# Patient Record
Sex: FEMALE | Race: OTHER | HISPANIC OR LATINO | URBAN - METROPOLITAN AREA
[De-identification: names, ages, dates, MRNs, and addresses within clinical notes are randomized per-mention and may not be internally consistent; named-entity substitution may affect disease eponyms.]

---

## 2017-08-20 ENCOUNTER — EMERGENCY (EMERGENCY)
Facility: HOSPITAL | Age: 27
LOS: 1 days | Discharge: HOME | End: 2017-08-20
Attending: EMERGENCY MEDICINE | Admitting: EMERGENCY MEDICINE
Payer: COMMERCIAL

## 2017-08-20 VITALS
RESPIRATION RATE: 16 BRPM | HEART RATE: 131 BPM | OXYGEN SATURATION: 96 % | TEMPERATURE: 98 F | SYSTOLIC BLOOD PRESSURE: 104 MMHG | WEIGHT: 119.93 LBS | DIASTOLIC BLOOD PRESSURE: 69 MMHG | HEIGHT: 66 IN

## 2017-08-20 VITALS
TEMPERATURE: 98 F | DIASTOLIC BLOOD PRESSURE: 68 MMHG | SYSTOLIC BLOOD PRESSURE: 111 MMHG | RESPIRATION RATE: 18 BRPM | OXYGEN SATURATION: 99 % | HEART RATE: 89 BPM

## 2017-08-20 LAB
APAP SERPL-MCNC: <7.5 UG/ML — LOW (ref 10–26)
SALICYLATES SERPL-MCNC: <2 MG/DL — LOW (ref 10–20)

## 2017-08-20 PROCEDURE — 99285 EMERGENCY DEPT VISIT HI MDM: CPT | Mod: 25

## 2017-08-20 PROCEDURE — 96374 THER/PROPH/DIAG INJ IV PUSH: CPT

## 2017-08-20 PROCEDURE — 36415 COLL VENOUS BLD VENIPUNCTURE: CPT

## 2017-08-20 PROCEDURE — 80307 DRUG TEST PRSMV CHEM ANLYZR: CPT

## 2017-08-20 PROCEDURE — 99285 EMERGENCY DEPT VISIT HI MDM: CPT

## 2017-08-20 PROCEDURE — 93010 ELECTROCARDIOGRAM REPORT: CPT

## 2017-08-20 PROCEDURE — 96375 TX/PRO/DX INJ NEW DRUG ADDON: CPT

## 2017-08-20 PROCEDURE — 93005 ELECTROCARDIOGRAM TRACING: CPT

## 2017-08-20 RX ORDER — ONDANSETRON 8 MG/1
4 TABLET, FILM COATED ORAL ONCE
Qty: 0 | Refills: 0 | Status: COMPLETED | OUTPATIENT
Start: 2017-08-20 | End: 2017-08-20

## 2017-08-20 RX ORDER — SODIUM CHLORIDE 9 MG/ML
1000 INJECTION INTRAMUSCULAR; INTRAVENOUS; SUBCUTANEOUS ONCE
Qty: 0 | Refills: 0 | Status: COMPLETED | OUTPATIENT
Start: 2017-08-20 | End: 2017-08-20

## 2017-08-20 RX ORDER — NALOXONE HYDROCHLORIDE 4 MG/.1ML
2 SPRAY NASAL ONCE
Qty: 0 | Refills: 0 | Status: COMPLETED | OUTPATIENT
Start: 2017-08-20 | End: 2017-08-20

## 2017-08-20 RX ADMIN — SODIUM CHLORIDE 3000 MILLILITER(S): 9 INJECTION INTRAMUSCULAR; INTRAVENOUS; SUBCUTANEOUS at 14:17

## 2017-08-20 RX ADMIN — Medication 1 MILLIGRAM(S): at 09:45

## 2017-08-20 RX ADMIN — ONDANSETRON 4 MILLIGRAM(S): 8 TABLET, FILM COATED ORAL at 14:17

## 2017-08-20 RX ADMIN — NALOXONE HYDROCHLORIDE 2 MILLIGRAM(S): 4 SPRAY NASAL at 09:00

## 2017-08-20 RX ADMIN — SODIUM CHLORIDE 3000 MILLILITER(S): 9 INJECTION INTRAMUSCULAR; INTRAVENOUS; SUBCUTANEOUS at 09:00

## 2017-08-20 NOTE — ED BEHAVIORAL HEALTH NOTE - BEHAVIORAL HEALTH NOTE
SWNote: assistant nurse manager Min requested worker to meet with pt regarding substance abuse issues. Worker met with pt (Darlene) and Renay: assistant nurse manager Min requested worker to meet with pt regarding substance abuse issues. Worker met with pt and her aunt Denise(at bedside) pt authorized worker to speak in front of her aunt. Reportedly, pt has come to the resolution to stop drinking. Pt states she has been drinking since she was 16 yold of age on and off, states she has periods of times when she does not drink much and others when she start and can't stop. Pt states she has never thought she was going to end up at an ED unresponsive, feels very afraid about the thought that she could  lose her life. Reportedly, pt has two children (12yold dghtr and 9yold son) whom she describes as the reason to exist and would not like to leave them without a mother . Pt is  from their father and share custody with him. States that she usually drinks while they are with him or when they are staying with their cousins. States that she has been on vacation too and she has started drinking with her friends again. Reportedly, pt drinks beer most of the time ( around 6-12 ) Denies any other substance use on a regular basis however, admits doing what she thought it was cocaine last night . Pt believes she was given other substance because she has never felt as bad as she feels. Extensive talk about harmful effects from ETOH and other substances, pt receptive. States she wants to take care of her problem, pt not ready for inpatient rehab would like to do it on her own and on an outpatient basis. Worker provided written information about outpatient and inpatient programs available to her in her community. pt's aunt Denise states the whole family will be providing support and whatever is needed for pt to stop the habit. Worker encouraged pt to reconsider and to think about an inpatient program, states she has a job that she enjoys and would not like to lose it. Pt accepted resources list and [facility] programs will follow up accordingly. Pt can think about any other SW needs at this moment.
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
## 2017-08-20 NOTE — ED ADULT NURSE REASSESSMENT NOTE - NS ED NURSE REASSESS COMMENT FT1
pt states she would like to speak to someone for her alcohol and drug use, states she wants help, SW and  made aware, will continue to monitor.
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
## 2017-08-25 ENCOUNTER — EMERGENCY (EMERGENCY)
Facility: HOSPITAL | Age: 27
LOS: 1 days | Discharge: LEFT BEFORE DISCHARGE | End: 2017-08-25
Attending: EMERGENCY MEDICINE
Payer: COMMERCIAL

## 2017-08-25 VITALS
SYSTOLIC BLOOD PRESSURE: 147 MMHG | HEIGHT: 63 IN | WEIGHT: 139.99 LBS | HEART RATE: 71 BPM | RESPIRATION RATE: 20 BRPM | TEMPERATURE: 98 F | OXYGEN SATURATION: 99 % | DIASTOLIC BLOOD PRESSURE: 91 MMHG

## 2017-08-25 LAB
APPEARANCE UR: CLEAR — SIGNIFICANT CHANGE UP
BILIRUB UR-MCNC: NEGATIVE — SIGNIFICANT CHANGE UP
COLOR SPEC: YELLOW — SIGNIFICANT CHANGE UP
DIFF PNL FLD: NEGATIVE — SIGNIFICANT CHANGE UP
GLUCOSE UR QL: NEGATIVE MG/DL — SIGNIFICANT CHANGE UP
HCG UR QL: NEGATIVE — SIGNIFICANT CHANGE UP
KETONES UR-MCNC: NEGATIVE — SIGNIFICANT CHANGE UP
LEUKOCYTE ESTERASE UR-ACNC: NEGATIVE — SIGNIFICANT CHANGE UP
NITRITE UR-MCNC: NEGATIVE — SIGNIFICANT CHANGE UP
PH UR: 7 — SIGNIFICANT CHANGE UP (ref 5–8)
PROT UR-MCNC: NEGATIVE MG/DL — SIGNIFICANT CHANGE UP
SP GR SPEC: 1.01 — SIGNIFICANT CHANGE UP (ref 1.01–1.02)
UROBILINOGEN FLD QL: NEGATIVE MG/DL — SIGNIFICANT CHANGE UP

## 2017-08-25 PROCEDURE — 81025 URINE PREGNANCY TEST: CPT

## 2017-08-25 PROCEDURE — 81003 URINALYSIS AUTO W/O SCOPE: CPT

## 2017-08-25 NOTE — ED PROVIDER NOTE - OBJECTIVE STATEMENT
pt presents s/p unresponsive responded to narcan in field. she appears intoxicated unable to provide any furthe hirstory no signs over trauma
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
## 2017-08-25 NOTE — ED PROVIDER NOTE - MEDICAL DECISION MAKING DETAILS
pt became clincially sober denies si or hi ambulating discahrge neuro exam showing neuro: CN II - XII intact, EOMI, PERRL, no papilledema, 5/5 muscle strength x 4 extremities, no sensory deficits, 2+ dtr globally, negative babinski, no ataxic gait, normal PATRICIA and FNT, normal romberg

## 2018-05-05 ENCOUNTER — EMERGENCY (EMERGENCY)
Facility: HOSPITAL | Age: 28
LOS: 1 days | End: 2018-05-05
Attending: STUDENT IN AN ORGANIZED HEALTH CARE EDUCATION/TRAINING PROGRAM | Admitting: EMERGENCY MEDICINE
Payer: COMMERCIAL

## 2018-05-05 VITALS — WEIGHT: 132.06 LBS

## 2018-05-05 DIAGNOSIS — F33.9 MAJOR DEPRESSIVE DISORDER, RECURRENT, UNSPECIFIED: ICD-10-CM

## 2018-05-05 DIAGNOSIS — F10.10 ALCOHOL ABUSE, UNCOMPLICATED: ICD-10-CM

## 2018-05-05 LAB
ALBUMIN SERPL ELPH-MCNC: 4.9 G/DL — SIGNIFICANT CHANGE UP (ref 3.3–5.2)
ALP SERPL-CCNC: 102 U/L — SIGNIFICANT CHANGE UP (ref 40–120)
ALT FLD-CCNC: 34 U/L — HIGH
AMPHET UR-MCNC: NEGATIVE — SIGNIFICANT CHANGE UP
ANION GAP SERPL CALC-SCNC: 15 MMOL/L — SIGNIFICANT CHANGE UP (ref 5–17)
APAP SERPL-MCNC: <7.5 UG/ML — LOW (ref 10–26)
APPEARANCE UR: CLEAR — SIGNIFICANT CHANGE UP
APTT BLD: 27.6 SEC — SIGNIFICANT CHANGE UP (ref 27.5–37.4)
AST SERPL-CCNC: 31 U/L — SIGNIFICANT CHANGE UP
BARBITURATES UR SCN-MCNC: NEGATIVE — SIGNIFICANT CHANGE UP
BASE EXCESS BLDV CALC-SCNC: -2.7 MMOL/L — LOW (ref -2–2)
BASOPHILS # BLD AUTO: 0 K/UL — SIGNIFICANT CHANGE UP (ref 0–0.2)
BASOPHILS NFR BLD AUTO: 0.1 % — SIGNIFICANT CHANGE UP (ref 0–2)
BENZODIAZ UR-MCNC: NEGATIVE — SIGNIFICANT CHANGE UP
BILIRUB SERPL-MCNC: <0.2 MG/DL — LOW (ref 0.4–2)
BILIRUB UR-MCNC: NEGATIVE — SIGNIFICANT CHANGE UP
BLD GP AB SCN SERPL QL: SIGNIFICANT CHANGE UP
BUN SERPL-MCNC: 4 MG/DL — LOW (ref 8–20)
CA-I SERPL-SCNC: 1.1 MMOL/L — LOW (ref 1.15–1.33)
CALCIUM SERPL-MCNC: 9.1 MG/DL — SIGNIFICANT CHANGE UP (ref 8.6–10.2)
CHLORIDE BLDV-SCNC: 112 MMOL/L — HIGH (ref 98–107)
CHLORIDE SERPL-SCNC: 103 MMOL/L — SIGNIFICANT CHANGE UP (ref 98–107)
CK MB CFR SERPL CALC: 2.6 NG/ML — SIGNIFICANT CHANGE UP (ref 0–6.7)
CK SERPL-CCNC: 196 U/L — HIGH (ref 25–170)
CO2 SERPL-SCNC: 24 MMOL/L — SIGNIFICANT CHANGE UP (ref 22–29)
COCAINE METAB.OTHER UR-MCNC: POSITIVE
COLOR SPEC: YELLOW — SIGNIFICANT CHANGE UP
CREAT SERPL-MCNC: 0.56 MG/DL — SIGNIFICANT CHANGE UP (ref 0.5–1.3)
DIFF PNL FLD: ABNORMAL
EOSINOPHIL # BLD AUTO: 0 K/UL — SIGNIFICANT CHANGE UP (ref 0–0.5)
EOSINOPHIL NFR BLD AUTO: 0.3 % — SIGNIFICANT CHANGE UP (ref 0–6)
EPI CELLS # UR: SIGNIFICANT CHANGE UP
ETHANOL SERPL-MCNC: 206 MG/DL — SIGNIFICANT CHANGE UP
GAS PNL BLDA: SIGNIFICANT CHANGE UP
GAS PNL BLDV: 152 MMOL/L — HIGH (ref 135–145)
GAS PNL BLDV: SIGNIFICANT CHANGE UP
GAS PNL BLDV: SIGNIFICANT CHANGE UP
GLUCOSE BLDV-MCNC: 100 MG/DL — HIGH (ref 70–99)
GLUCOSE SERPL-MCNC: 100 MG/DL — SIGNIFICANT CHANGE UP (ref 70–115)
GLUCOSE UR QL: NEGATIVE MG/DL — SIGNIFICANT CHANGE UP
HCG SERPL-ACNC: <5 MIU/ML — SIGNIFICANT CHANGE UP
HCO3 BLDV-SCNC: 22 MMOL/L — SIGNIFICANT CHANGE UP (ref 20–26)
HCT VFR BLD CALC: 36.9 % — LOW (ref 37–47)
HCT VFR BLDA CALC: 35 — LOW (ref 39–50)
HGB BLD CALC-MCNC: 11.4 G/DL — LOW (ref 11.5–15.5)
HGB BLD-MCNC: 11.7 G/DL — LOW (ref 12–16)
INR BLD: 1.02 RATIO — SIGNIFICANT CHANGE UP (ref 0.88–1.16)
KETONES UR-MCNC: NEGATIVE — SIGNIFICANT CHANGE UP
LACTATE BLDV-MCNC: 1.7 MMOL/L — SIGNIFICANT CHANGE UP (ref 0.5–2)
LEUKOCYTE ESTERASE UR-ACNC: ABNORMAL
LIDOCAIN IGE QN: 51 U/L — SIGNIFICANT CHANGE UP (ref 22–51)
LYMPHOCYTES # BLD AUTO: 3 K/UL — SIGNIFICANT CHANGE UP (ref 1–4.8)
LYMPHOCYTES # BLD AUTO: 42.6 % — SIGNIFICANT CHANGE UP (ref 20–55)
MAGNESIUM SERPL-MCNC: 2.3 MG/DL — SIGNIFICANT CHANGE UP (ref 1.6–2.6)
MCHC RBC-ENTMCNC: 24.1 PG — LOW (ref 27–31)
MCHC RBC-ENTMCNC: 31.7 G/DL — LOW (ref 32–36)
MCV RBC AUTO: 76.1 FL — LOW (ref 81–99)
METHADONE UR-MCNC: NEGATIVE — SIGNIFICANT CHANGE UP
MONOCYTES # BLD AUTO: 0.7 K/UL — SIGNIFICANT CHANGE UP (ref 0–0.8)
MONOCYTES NFR BLD AUTO: 9.8 % — SIGNIFICANT CHANGE UP (ref 3–10)
NEUTROPHILS # BLD AUTO: 3.3 K/UL — SIGNIFICANT CHANGE UP (ref 1.8–8)
NEUTROPHILS NFR BLD AUTO: 46.9 % — SIGNIFICANT CHANGE UP (ref 37–73)
NITRITE UR-MCNC: NEGATIVE — SIGNIFICANT CHANGE UP
OPIATES UR-MCNC: NEGATIVE — SIGNIFICANT CHANGE UP
OTHER CELLS CSF MANUAL: 10 ML/DL — LOW (ref 18–22)
PCO2 BLDV: 59 MMHG — HIGH (ref 35–50)
PCP SPEC-MCNC: SIGNIFICANT CHANGE UP
PCP UR-MCNC: NEGATIVE — SIGNIFICANT CHANGE UP
PH BLDV: 7.24 — LOW (ref 7.32–7.43)
PH UR: 7 — SIGNIFICANT CHANGE UP (ref 5–8)
PLATELET # BLD AUTO: 343 K/UL — SIGNIFICANT CHANGE UP (ref 150–400)
PO2 BLDV: 42 MMHG — SIGNIFICANT CHANGE UP (ref 25–45)
POTASSIUM BLDV-SCNC: 3.5 MMOL/L — SIGNIFICANT CHANGE UP (ref 3.4–4.5)
POTASSIUM SERPL-MCNC: 3.8 MMOL/L — SIGNIFICANT CHANGE UP (ref 3.5–5.3)
POTASSIUM SERPL-SCNC: 3.8 MMOL/L — SIGNIFICANT CHANGE UP (ref 3.5–5.3)
PROT SERPL-MCNC: 8.8 G/DL — HIGH (ref 6.6–8.7)
PROT UR-MCNC: 30 MG/DL
PROTHROM AB SERPL-ACNC: 11.2 SEC — SIGNIFICANT CHANGE UP (ref 9.8–12.7)
RBC # BLD: 4.85 M/UL — SIGNIFICANT CHANGE UP (ref 4.4–5.2)
RBC # FLD: 16.2 % — HIGH (ref 11–15.6)
RBC CASTS # UR COMP ASSIST: ABNORMAL /HPF (ref 0–4)
SALICYLATES SERPL-MCNC: <0.6 MG/DL — LOW (ref 10–20)
SAO2 % BLDV: 62 % — SIGNIFICANT CHANGE UP
SODIUM SERPL-SCNC: 142 MMOL/L — SIGNIFICANT CHANGE UP (ref 135–145)
SP GR SPEC: 1 — LOW (ref 1.01–1.02)
THC UR QL: NEGATIVE — SIGNIFICANT CHANGE UP
TSH SERPL-MCNC: 0.8 UIU/ML — SIGNIFICANT CHANGE UP (ref 0.27–4.2)
TYPE + AB SCN PNL BLD: SIGNIFICANT CHANGE UP
UROBILINOGEN FLD QL: NEGATIVE MG/DL — SIGNIFICANT CHANGE UP
WBC # BLD: 7 K/UL — SIGNIFICANT CHANGE UP (ref 4.8–10.8)
WBC # FLD AUTO: 7 K/UL — SIGNIFICANT CHANGE UP (ref 4.8–10.8)
WBC UR QL: SIGNIFICANT CHANGE UP

## 2018-05-05 PROCEDURE — 74177 CT ABD & PELVIS W/CONTRAST: CPT | Mod: 26

## 2018-05-05 PROCEDURE — 99220: CPT

## 2018-05-05 PROCEDURE — 93010 ELECTROCARDIOGRAM REPORT: CPT | Mod: 76

## 2018-05-05 PROCEDURE — 71045 X-RAY EXAM CHEST 1 VIEW: CPT | Mod: 26

## 2018-05-05 PROCEDURE — 76856 US EXAM PELVIC COMPLETE: CPT | Mod: 26

## 2018-05-05 PROCEDURE — 99285 EMERGENCY DEPT VISIT HI MDM: CPT

## 2018-05-05 RX ORDER — FAMOTIDINE 10 MG/ML
20 INJECTION INTRAVENOUS ONCE
Qty: 0 | Refills: 0 | Status: COMPLETED | OUTPATIENT
Start: 2018-05-05 | End: 2018-05-05

## 2018-05-05 RX ORDER — SODIUM CHLORIDE 9 MG/ML
1000 INJECTION, SOLUTION INTRAVENOUS
Qty: 0 | Refills: 0 | Status: DISCONTINUED | OUTPATIENT
Start: 2018-05-05 | End: 2018-05-10

## 2018-05-05 RX ORDER — ACETAMINOPHEN 500 MG
650 TABLET ORAL EVERY 6 HOURS
Qty: 0 | Refills: 0 | Status: DISCONTINUED | OUTPATIENT
Start: 2018-05-05 | End: 2018-05-10

## 2018-05-05 RX ORDER — KETOROLAC TROMETHAMINE 30 MG/ML
15 SYRINGE (ML) INJECTION ONCE
Qty: 0 | Refills: 0 | Status: DISCONTINUED | OUTPATIENT
Start: 2018-05-05 | End: 2018-05-05

## 2018-05-05 RX ORDER — MORPHINE SULFATE 50 MG/1
2 CAPSULE, EXTENDED RELEASE ORAL ONCE
Qty: 0 | Refills: 0 | Status: DISCONTINUED | OUTPATIENT
Start: 2018-05-05 | End: 2018-05-05

## 2018-05-05 RX ORDER — ONDANSETRON 8 MG/1
4 TABLET, FILM COATED ORAL ONCE
Qty: 0 | Refills: 0 | Status: COMPLETED | OUTPATIENT
Start: 2018-05-05 | End: 2018-05-05

## 2018-05-05 RX ORDER — HYDROMORPHONE HYDROCHLORIDE 2 MG/ML
0.5 INJECTION INTRAMUSCULAR; INTRAVENOUS; SUBCUTANEOUS ONCE
Qty: 0 | Refills: 0 | Status: DISCONTINUED | OUTPATIENT
Start: 2018-05-05 | End: 2018-05-05

## 2018-05-05 RX ORDER — ACETAMINOPHEN 500 MG
1000 TABLET ORAL ONCE
Qty: 0 | Refills: 0 | Status: COMPLETED | OUTPATIENT
Start: 2018-05-05 | End: 2018-05-05

## 2018-05-05 RX ORDER — METOCLOPRAMIDE HCL 10 MG
10 TABLET ORAL EVERY 6 HOURS
Qty: 0 | Refills: 0 | Status: DISCONTINUED | OUTPATIENT
Start: 2018-05-05 | End: 2018-05-10

## 2018-05-05 RX ORDER — FAMOTIDINE 10 MG/ML
20 INJECTION INTRAVENOUS EVERY 12 HOURS
Qty: 0 | Refills: 0 | Status: DISCONTINUED | OUTPATIENT
Start: 2018-05-05 | End: 2018-05-06

## 2018-05-05 RX ORDER — METOCLOPRAMIDE HCL 10 MG
10 TABLET ORAL ONCE
Qty: 0 | Refills: 0 | Status: COMPLETED | OUTPATIENT
Start: 2018-05-05 | End: 2018-05-05

## 2018-05-05 RX ORDER — SODIUM CHLORIDE 9 MG/ML
1000 INJECTION INTRAMUSCULAR; INTRAVENOUS; SUBCUTANEOUS
Qty: 0 | Refills: 0 | Status: DISCONTINUED | OUTPATIENT
Start: 2018-05-05 | End: 2018-05-05

## 2018-05-05 RX ORDER — SODIUM CHLORIDE 9 MG/ML
1000 INJECTION INTRAMUSCULAR; INTRAVENOUS; SUBCUTANEOUS
Qty: 0 | Refills: 0 | Status: COMPLETED | OUTPATIENT
Start: 2018-05-05 | End: 2018-05-05

## 2018-05-05 RX ADMIN — SODIUM CHLORIDE 150 MILLILITER(S): 9 INJECTION, SOLUTION INTRAVENOUS at 23:23

## 2018-05-05 RX ADMIN — HYDROMORPHONE HYDROCHLORIDE 0.5 MILLIGRAM(S): 2 INJECTION INTRAMUSCULAR; INTRAVENOUS; SUBCUTANEOUS at 11:40

## 2018-05-05 RX ADMIN — Medication 15 MILLIGRAM(S): at 21:13

## 2018-05-05 RX ADMIN — Medication 400 MILLIGRAM(S): at 19:16

## 2018-05-05 RX ADMIN — FAMOTIDINE 20 MILLIGRAM(S): 10 INJECTION INTRAVENOUS at 11:20

## 2018-05-05 RX ADMIN — Medication 1000 MILLIGRAM(S): at 19:40

## 2018-05-05 RX ADMIN — FAMOTIDINE 20 MILLIGRAM(S): 10 INJECTION INTRAVENOUS at 23:23

## 2018-05-05 RX ADMIN — SODIUM CHLORIDE 2000 MILLILITER(S): 9 INJECTION INTRAMUSCULAR; INTRAVENOUS; SUBCUTANEOUS at 11:50

## 2018-05-05 RX ADMIN — Medication 15 MILLIGRAM(S): at 21:15

## 2018-05-05 RX ADMIN — SODIUM CHLORIDE 2000 MILLILITER(S): 9 INJECTION INTRAMUSCULAR; INTRAVENOUS; SUBCUTANEOUS at 11:20

## 2018-05-05 RX ADMIN — ONDANSETRON 4 MILLIGRAM(S): 8 TABLET, FILM COATED ORAL at 15:49

## 2018-05-05 RX ADMIN — Medication 10 MILLIGRAM(S): at 19:16

## 2018-05-05 RX ADMIN — ONDANSETRON 4 MILLIGRAM(S): 8 TABLET, FILM COATED ORAL at 11:29

## 2018-05-05 RX ADMIN — HYDROMORPHONE HYDROCHLORIDE 0.5 MILLIGRAM(S): 2 INJECTION INTRAMUSCULAR; INTRAVENOUS; SUBCUTANEOUS at 11:20

## 2018-05-05 NOTE — ED CDU PROVIDER INITIAL DAY NOTE - OBJECTIVE STATEMENT
29 y/o female presents to the ED s/p suicidal attempt which onset today. Pt notes that her daughter told pt that she did not love her anymore and that she did not want to live with pt anymore. Pt got upset and took 15 pills of 600mg and Omeprazole (unknown quantity or dosage) about 6 hours ago. She notes that she also drank alcohol. Pt currently c/o severe abd pain. Denies HA, numbness, tingling, or CP. No further complaints at this time.

## 2018-05-05 NOTE — ED CDU PROVIDER INITIAL DAY NOTE - NS ED ROS FT
CONST: no fevers, no chills, no trauma  ENT: no sore throat,  CV: no chest pain, no palpitations, no orthopnea, no extremity pain or swelling  RESP: no shortness of breath, no cough, no sputum, no pleurisy, no wheezing  ABD: (+) abdominal pain, +vomiting, no diarrhea, no black or bloody stool  : no dysuria, no hematuria, no frequency, no urgency, no vaginal bleeding  MSK: no back pain, no neck pain, no extremity pain  NEURO: no headache, no sensory disturbances, no focal weakness, no dizziness  HEME: no easy bleeding or bruising  PSYCH: see HPI  SKIN: no diaphoresis, no rash   Psych: Suicidal attempt.

## 2018-05-05 NOTE — ED CDU PROVIDER INITIAL DAY NOTE - DETAILS
- d/w toxicology - pt should expect to clear by tomorrow  - IVF  - H2 blockers  - non-narcotic pain control  - psychiatric placement if tolerates PO, medical admission if does not tolerate PO by tomorrow AM

## 2018-05-05 NOTE — ED PROVIDER NOTE - PHYSICAL EXAMINATION
VITALS: reviewed  GEN: mild distress, A & O x 4  HEAD/EYES: NCAT, PERRL, EOMI, anicteric sclerae, no conjunctival pallor  ENT: mucus membranes moist, oropharynx WNL, trachea midline, no JVD  RESP: lungs CTA with equal breath sounds bilaterally, chest wall nontender and atraumatic  CV: heart with reg rhythm S1, S2, no murmur; distal pulses intact and symmetric bilaterally  ABDOMEN: diffused tenderness   : no CVAT  MSK: extremities atraumatic and nontender, no edema, no asymmetry. the back is without midline or lateral tenderness, there is no spinal deformity or stepoff and the back is ranged painlessly. the neck has no midline tenderness, deformity, or stepoff, and is ranged painlessly.  SKIN: warm, dry, no rash, no bruising, no cyanosis. color appropriate for ethnicity  NEURO: alert, mentating appropriately, no facial asymmetry. gross sensation, motor, coordination are intact  PSYCH: Affect appropriate VITALS: reviewed  GEN: mild distress, A & O x 4  HEAD/EYES: NCAT, 5mm PERRL, anicteric sclerae, no conjunctival pallor  ENT: mucus dry, making secretions, oropharynx WNL, trachea midline, no JVD  RESP: lungs CTA with equal breath sounds bilaterally, chest wall nontender and atraumatic  CV: heart with reg rhythm S1, S2, no murmur; distal pulses intact and symmetric bilaterally  ABDOMEN: suprapubic and mid lower abd tenderness.   : no CVAT  MSK: extremities atraumatic and nontender, no edema, no asymmetry. the back is without midline or lateral tenderness, there is no spinal deformity or stepoff and the back is ranged painlessly. the neck has no midline tenderness, deformity, or stepoff, and is ranged painlessly.  SKIN: warm, dry, no rash, no bruising, no cyanosis. color appropriate for ethnicity  NEURO: alert, mentating appropriately, Slurring speech.   PSYCH: Affect appropriate VITALS: reviewed and notable for tachycardia  GEN: nauseated, mildly ill appearing, tearful, clinically intoxicated, A & O x name, place, situation, recent events  HEAD/EYES: NCAT, 5mm PERRL, anicteric sclerae, no conjunctival pallor  ENT: mucus dry, making secretions, oropharynx WNL, trachea midline, no JVD  RESP: lungs CTA with equal breath sounds bilaterally, chest wall nontender and atraumatic  CV: heart with reg rhythm S1, S2, no murmur; distal pulses intact and symmetric bilaterally  ABDOMEN: suprapubic and mid lower abd tenderness.   : no CVAT  MSK: extremities atraumatic and nontender, no edema, no asymmetry. the back is without midline or lateral tenderness, there is no spinal deformity or stepoff and the back is ranged painlessly. the neck has no midline tenderness, deformity, or stepoff, and is ranged painlessly.  SKIN: warm, dry, no rash, no bruising, no cyanosis. color appropriate for ethnicity, +normal secretions  NEURO: alert, mildly somnolent, protecting airway, GCS 15, Slurring speech. moving all extremities with good symmetric strength, sensation grossly intact, coordination grossly intact but not cooperative with formal testing, downgoing toes, no rigidity, no clonus  PSYCH: Affect appropriate

## 2018-05-05 NOTE — ED BEHAVIORAL HEALTH ASSESSMENT NOTE - HPI (INCLUDE ILLNESS QUALITY, SEVERITY, DURATION, TIMING, CONTEXT, MODIFYING FACTORS, ASSOCIATED SIGNS AND SYMPTOMS)
Pt. is a 29 yo female who presents to ED s/p overdose on 15 Motrin 600mg tabs and unknown amount of Omeprazole.  Pt. reports she had been drinking last night and this morning.  Her daughter told her she did not love her and did not want to live with her anymore.  Pt reports she was upset and took the Motrin and Omeprazole hoping to kill herself and continued to drink alcohol.  She called aunt who brought her to the hospital.  Pt. currently c/o of severe abdominal pain.  Pt. reports she was at [facility] last year for OD on alcohol and cocaine.  Pt. reported she has been drinking since she was 15yo.  Evasive when asked if she was trying to kill herself then.  She was referred to [facility] - she went twice and then stopped because of insurance problems.  She reports she was prescribed medication but cannot remember the name and states she did not take it.  Pt. denies auditory/visual hallucinations, delusions, paranoia.  She denies homicidal ideation.  Pt. requires inpatient psychiatric hospitalization for stabilization and medication management. Pt. is a 29 yo female who presents to ED s/p overdose on 15 Motrin 600mg tabs and unknown amount of Omeprazole.  Pt. reports she had been drinking last night and this morning.  Her daughter told her she did not love her and did not want to live with her anymore.  Pt reports she was upset and took the Motrin and Omeprazole hoping to kill herself and continued to drink alcohol.  She called aunt who brought her to the hospital.  Pt. currently c/o of severe abdominal pain and vomiting.  Pt. reports she was at [facility] last year for OD on alcohol and cocaine.  .  Evasive when asked if she was trying to kill herself then.  Pt. reported she has been drinking since she was 17yo.  She was referred to [facility] - she went twice and then stopped because of insurance problems.  She reports she was prescribed medication but cannot remember the name and states she did not take it.  Pt. denies auditory/visual hallucinations, delusions, paranoia.  She denies homicidal ideation.  Pt. requires inpatient psychiatric hospitalization for stabilization and medication management.

## 2018-05-05 NOTE — ED BEHAVIORAL HEALTH ASSESSMENT NOTE - SUICIDE PROTECTIVE FACTORS
Identifies reasons for living/Responsibility to family and others/Engaged in work or school/Supportive social network or family

## 2018-05-05 NOTE — ED CDU PROVIDER INITIAL DAY NOTE - PROGRESS NOTE DETAILS
Mental status improved, exam improving, awaiting CT. Aunt who brought her in says she is improved. report of low pH on iniital VBG. Appears most consistent with hypercarbia. Will get ABG after fluid  bolus CT cannot obtain consent for contrast. Pt has no contraindications and we feel administrative consent is necessary for an emergent rule out of acute intraabdominal pathology.

## 2018-05-05 NOTE — ED CDU PROVIDER INITIAL DAY NOTE - PHYSICAL EXAMINATION
VITALS: reviewed and notable for tachycardia  GEN: nauseated, mildly ill appearing, tearful, clinically intoxicated, A & O x name, place, situation, recent events  HEAD/EYES: NCAT, 5mm PERRL, anicteric sclerae, no conjunctival pallor  ENT: mucus dry, making secretions, oropharynx WNL, trachea midline, no JVD  RESP: lungs CTA with equal breath sounds bilaterally, chest wall nontender and atraumatic  CV: heart with reg rhythm S1, S2, no murmur; distal pulses intact and symmetric bilaterally  ABDOMEN: suprapubic and mid lower abd tenderness.   : no CVAT, declines pelvic exam  MSK: extremities atraumatic and nontender, no edema, no asymmetry. the back is without midline or lateral tenderness, there is no spinal deformity or stepoff and the back is ranged painlessly. the neck has no midline tenderness, deformity, or stepoff, and is ranged painlessly.  SKIN: warm, dry, no rash, no bruising, no cyanosis. color appropriate for ethnicity, +normal secretions  NEURO: alert, mildly somnolent, protecting airway, GCS 15, Slurring speech. moving all extremities with good symmetric strength, sensation grossly intact, coordination grossly intact but not cooperative with formal testing, downgoing toes, no rigidity, no clonus  PSYCH: Affect appropriate

## 2018-05-05 NOTE — ED ADULT NURSE NOTE - OBJECTIVE STATEMENT
Assumed pt care at 1115.  Pt a&ox3 states that she took about 15 600mg ibuprofen and drank alcohol.  Pt states her daughter told her that she did not want her to live anymore.  Pt c/o abdominal pain 8/10, no acute s/s of respiratory distress noted or reported, will continue to monitor

## 2018-05-05 NOTE — ED PROVIDER NOTE - NS ED ROS FT
CONST: no fevers, no chills, no trauma  EYES: no pain, no visual disturbances  ENT: no sore throat, no epistaxis, no rhinorrhea, no hearing changes  CV: no chest pain, no palpitations, no orthopnea, no extremity pain or swelling  RESP: no shortness of breath, no cough, no sputum, no pleurisy, no wheezing  ABD: (+) abdominal pain, no nausea, no vomiting, no diarrhea, no black or bloody stool  : no dysuria, no hematuria, no frequency, no urgency  MSK: no back pain, no neck pain, no extremity pain  NEURO: no headache, no sensory disturbances, no focal weakness, no dizziness  HEME: no easy bleeding or bruising  SKIN: no diaphoresis, no rash   Psych: Suicidal attempt. CONST: no fevers, no chills, no trauma  ENT: no sore throat,  CV: no chest pain, no palpitations, no orthopnea, no extremity pain or swelling  RESP: no shortness of breath, no cough, no sputum, no pleurisy, no wheezing  ABD: (+) abdominal pain, +vomiting, no diarrhea, no black or bloody stool  : no dysuria, no hematuria, no frequency, no urgency, no vaginal bleeding  MSK: no back pain, no neck pain, no extremity pain  NEURO: no headache, no sensory disturbances, no focal weakness, no dizziness  HEME: no easy bleeding or bruising  PSYCH: see HPI  SKIN: no diaphoresis, no rash   Psych: Suicidal attempt.

## 2018-05-05 NOTE — ED PROVIDER NOTE - PROGRESS NOTE DETAILS
initial labwork reassuring. Aditya from Behavioral health contacted, though pt not currently medically cleared. Mental status improved, exam improving, awaiting CT report of low pH on iniital VBG. Appears most consistent with hypercarbia. Will get ABG after fluid  bolus Mental status improved, exam improving, awaiting CT. Aunt who brought her in says she is improved. CT cannot obtain consent for contrast. Pt has no contraindications and we feel administrative consent is necessary for an emergent rule out of acute intraabdominal pathology. INtractible vomiting

## 2018-05-05 NOTE — ED BEHAVIORAL HEALTH ASSESSMENT NOTE - SUMMARY
Pt. is a 29 yo female who presents to ED s/p overdose on 15 Motrin 600mg tabs and unknown amount of Omeprazole.  Pt. reports she had been drinking last night and this morning.  Her daughter told her she did not love her and did not want to live with her anymore.  Pt reports she was upset and took the Motrin and Omeprazole hoping to kill herself and continued to drink alcohol.  She called aunt who brought her to the hospital.  Pt. currently c/o of severe abdominal pain.  Pt. reports she was at [facility] last year for OD on alcohol and cocaine.  Pt. reported she has been drinking since she was 15yo.  Evasive when asked if she was trying to kill herself then.  She was referred to [facility] - she went twice and then stopped because of insurance problems.  She reports she was prescribed medication but cannot remember the name and states she did not take it.  Pt. denies auditory/visual hallucinations, delusions, paranoia.  She denies homicidal ideation.  Pt. requires inpatient psychiatric hospitalization for stabilization and medication management.

## 2018-05-05 NOTE — ED PROVIDER NOTE - OBJECTIVE STATEMENT
29 y/o female with a hx of 27 y/o female presents to the ED s/p suicidal attempt which onset today. Pt notes that her daughter told pt that she did not want to live anymore. Pt got upset and took 15 pills of 600mg and Omeprazole (unknown quantity or dosage) about 6 hours ago. She notes that she also drank alcohol. Pt currently c/o severe abd pain. Denies HA, numbness, tingling, or CP. No further complaints at this time. 29 y/o female presents to the ED s/p suicidal attempt which onset today. Pt notes that her daughter told pt that she did not love her anymore and that she did not want to live with pt anymore. Pt got upset and took 15 pills of 600mg and Omeprazole (unknown quantity or dosage) about 6 hours ago. She notes that she also drank alcohol. Pt currently c/o severe abd pain. Denies HA, numbness, tingling, or CP. No further complaints at this time.

## 2018-05-05 NOTE — ED PROVIDER NOTE - CARE PLAN
Principal Discharge DX:	Intentional overdose of drug in tablet form  Secondary Diagnosis:	Right lower quadrant abdominal pain Principal Discharge DX:	Intractable vomiting with nausea, unspecified vomiting type  Secondary Diagnosis:	Right lower quadrant abdominal pain

## 2018-05-05 NOTE — ED CDU PROVIDER INITIAL DAY NOTE - MEDICAL DECISION MAKING DETAILS
11h in ED. D/w toxicology unlikely to be medical emergency that requires admission. Suspect gastritis/etneritis from overdose. Toxicology says high does omeprazole can also cause vomiting and diarrhea. Will observe overnight for pain control and hydration

## 2018-05-05 NOTE — ED PROVIDER NOTE - MEDICAL DECISION MAKING DETAILS
27 y/o female s/p suicidal attempt, took 15 pills of 600 mg Motrin and unknown amount of Omeprazole + Alcohol. Plan to obtain blood work, labs, medicate, UA, CT scan, X-ray, EKG and re-eval. Continue observing pt.

## 2018-05-05 NOTE — ED ADULT TRIAGE NOTE - CHIEF COMPLAINT QUOTE
Patient took unknown amount of 600mg Motrin pills this morning as per family.  Patient is suicidal as per family.  Patient responsive to mild tactile stimuli and not answering questions at this time.  Patient brought to critical care and code team called.

## 2018-05-05 NOTE — ED BEHAVIORAL HEALTH ASSESSMENT NOTE - DETAILS
11 yo daughter & 9yo son Pt. overdosed on 15 Motrin 600mg tabs and unknown amount of Omeprazole in SA. Upon acceptance Dr. Way aware 13 yo daughter & 10 yo son Admissions

## 2018-05-05 NOTE — ED BEHAVIORAL HEALTH ASSESSMENT NOTE - OTHER PAST PSYCHIATRIC HISTORY (INCLUDE DETAILS REGARDING ONSET, COURSE OF ILLNESS, INPATIENT/OUTPATIENT TREATMENT)
Seen at [facility] last year  but noncompliant with treatment & f/u. Seen at [facility] last year but noncompliant with treatment & f/u.

## 2018-05-05 NOTE — ED PROVIDER NOTE - PRINCIPAL DIAGNOSIS
Intentional overdose of drug in tablet form Intractable vomiting with nausea, unspecified vomiting type

## 2018-05-06 LAB
ETHANOL SERPL-MCNC: <10 MG/DL — SIGNIFICANT CHANGE UP
SALICYLATES SERPL-MCNC: <0.6 MG/DL — LOW (ref 10–20)

## 2018-05-06 PROCEDURE — 99226: CPT

## 2018-05-06 RX ORDER — FAMOTIDINE 10 MG/ML
40 INJECTION INTRAVENOUS ONCE
Qty: 0 | Refills: 0 | Status: COMPLETED | OUTPATIENT
Start: 2018-05-06 | End: 2018-05-06

## 2018-05-06 RX ADMIN — Medication 650 MILLIGRAM(S): at 23:53

## 2018-05-06 RX ADMIN — FAMOTIDINE 40 MILLIGRAM(S): 10 INJECTION INTRAVENOUS at 21:24

## 2018-05-06 RX ADMIN — Medication 650 MILLIGRAM(S): at 21:27

## 2018-05-06 RX ADMIN — FAMOTIDINE 20 MILLIGRAM(S): 10 INJECTION INTRAVENOUS at 06:28

## 2018-05-06 NOTE — ED CDU PROVIDER SUBSEQUENT DAY NOTE - HISTORY
recd sign out  plan to repeat alcohol and asa level, patient monitored, no further vomiting in ed noted  tolerated po wee. sitting up, feels betetr, plan to transfer to , continue observation

## 2018-05-06 NOTE — ED CDU PROVIDER SUBSEQUENT DAY NOTE - PROGRESS NOTE DETAILS
Received patient signout from Dr. More.  Patient presents with intentional motrin overdose.  Patient intially vomiting now improved.  Pending psych with plan to admit. Patient is medically cleared.  Pending psych dispo. Patient stable. Awaiting psych placement.

## 2018-05-06 NOTE — ED BEHAVIORAL HEALTH NOTE - BEHAVIORAL HEALTH NOTE
SW Note: Plan is to transfer pt for inpt psychiatric tx. Met with pt to discuss plans. Pt cooperative and pleasant. Aware of plan and in agreement. Referral pending at [facility]. Pt is willing to sign voluntary admission papers.   Discussed ETOH/substance issues. pt reports that she has been drinking ETOH since age 16. Reports mostly drinking on the weekends. Beer or liquor. Social to binge drinking. No hx of blackouts or seizures. No hx of legal issues from substance use. No hx of inpt substance abuse tx. Came to [facility] ED last summer for ETOH related issue and was referred to O/P tx at [facility] but did not go.   JUANA RN reports CIWA of 1 with mild nausea related to OD.   SW to follow for placement

## 2018-05-07 VITALS
DIASTOLIC BLOOD PRESSURE: 66 MMHG | SYSTOLIC BLOOD PRESSURE: 103 MMHG | TEMPERATURE: 99 F | HEART RATE: 53 BPM | OXYGEN SATURATION: 100 % | RESPIRATION RATE: 18 BRPM

## 2018-05-07 DIAGNOSIS — F33.9 MAJOR DEPRESSIVE DISORDER, RECURRENT, UNSPECIFIED: ICD-10-CM

## 2018-05-07 DIAGNOSIS — K21.9 GASTRO-ESOPHAGEAL REFLUX DISEASE WITHOUT ESOPHAGITIS: ICD-10-CM

## 2018-05-07 PROCEDURE — 85027 COMPLETE CBC AUTOMATED: CPT

## 2018-05-07 PROCEDURE — 99213 OFFICE O/P EST LOW 20 MIN: CPT

## 2018-05-07 PROCEDURE — 84295 ASSAY OF SERUM SODIUM: CPT

## 2018-05-07 PROCEDURE — 86850 RBC ANTIBODY SCREEN: CPT

## 2018-05-07 PROCEDURE — 93005 ELECTROCARDIOGRAM TRACING: CPT

## 2018-05-07 PROCEDURE — 96376 TX/PRO/DX INJ SAME DRUG ADON: CPT | Mod: XU

## 2018-05-07 PROCEDURE — 71045 X-RAY EXAM CHEST 1 VIEW: CPT

## 2018-05-07 PROCEDURE — 83735 ASSAY OF MAGNESIUM: CPT

## 2018-05-07 PROCEDURE — 83690 ASSAY OF LIPASE: CPT

## 2018-05-07 PROCEDURE — 83605 ASSAY OF LACTIC ACID: CPT

## 2018-05-07 PROCEDURE — 96375 TX/PRO/DX INJ NEW DRUG ADDON: CPT

## 2018-05-07 PROCEDURE — 82435 ASSAY OF BLOOD CHLORIDE: CPT

## 2018-05-07 PROCEDURE — 84443 ASSAY THYROID STIM HORMONE: CPT

## 2018-05-07 PROCEDURE — 84132 ASSAY OF SERUM POTASSIUM: CPT

## 2018-05-07 PROCEDURE — 84702 CHORIONIC GONADOTROPIN TEST: CPT

## 2018-05-07 PROCEDURE — 99217: CPT

## 2018-05-07 PROCEDURE — 82550 ASSAY OF CK (CPK): CPT

## 2018-05-07 PROCEDURE — 99285 EMERGENCY DEPT VISIT HI MDM: CPT | Mod: 25

## 2018-05-07 PROCEDURE — 74177 CT ABD & PELVIS W/CONTRAST: CPT

## 2018-05-07 PROCEDURE — 82947 ASSAY GLUCOSE BLOOD QUANT: CPT

## 2018-05-07 PROCEDURE — 96374 THER/PROPH/DIAG INJ IV PUSH: CPT | Mod: XU

## 2018-05-07 PROCEDURE — G0378: CPT

## 2018-05-07 PROCEDURE — 81001 URINALYSIS AUTO W/SCOPE: CPT

## 2018-05-07 PROCEDURE — 80307 DRUG TEST PRSMV CHEM ANLYZR: CPT

## 2018-05-07 PROCEDURE — 85610 PROTHROMBIN TIME: CPT

## 2018-05-07 PROCEDURE — 85014 HEMATOCRIT: CPT

## 2018-05-07 PROCEDURE — 86900 BLOOD TYPING SEROLOGIC ABO: CPT

## 2018-05-07 PROCEDURE — 85730 THROMBOPLASTIN TIME PARTIAL: CPT

## 2018-05-07 PROCEDURE — 82803 BLOOD GASES ANY COMBINATION: CPT

## 2018-05-07 PROCEDURE — 99211 OFF/OP EST MAY X REQ PHY/QHP: CPT

## 2018-05-07 PROCEDURE — 76856 US EXAM PELVIC COMPLETE: CPT

## 2018-05-07 PROCEDURE — 86901 BLOOD TYPING SEROLOGIC RH(D): CPT

## 2018-05-07 PROCEDURE — 36415 COLL VENOUS BLD VENIPUNCTURE: CPT

## 2018-05-07 PROCEDURE — 80053 COMPREHEN METABOLIC PANEL: CPT

## 2018-05-07 PROCEDURE — 82330 ASSAY OF CALCIUM: CPT

## 2018-05-07 PROCEDURE — 82553 CREATINE MB FRACTION: CPT

## 2018-05-07 RX ORDER — PANTOPRAZOLE SODIUM 20 MG/1
40 TABLET, DELAYED RELEASE ORAL
Qty: 0 | Refills: 0 | Status: DISCONTINUED | OUTPATIENT
Start: 2018-05-07 | End: 2018-05-10

## 2018-05-07 RX ADMIN — Medication 20 MILLIGRAM(S): at 09:47

## 2018-05-07 NOTE — ED BEHAVIORAL HEALTH NOTE - BEHAVIORAL HEALTH NOTE
Social work note: Pt evaluated by psychiatrist and requires inpatient hospitalization at this time per.  Worker placed call to [facility] and spoke with Deborah, admissions.  Clinincals were reviewed and pt was accepted to [facility] under the care of Dr. Ballard. Pt complete voluntary legals. Transportation arranged. Pt does not require authorization as she has straight medicaid.  No other social work needs at this time.

## 2018-05-07 NOTE — ED ADULT NURSE REASSESSMENT NOTE - NS ED NURSE REASSESS COMMENT FT1
Pt currently calm and cooperative at this present time, in no acute distress, resting, pt arousable, talking to undersigned states she feels fine at this time.  Will continue to monitor and maintain safety.
Pt currently resting in no acute distress at this present time, arousable.  Will continue to monitor and maintain safety.
Pt currently resting in no acute distress at this time, will continue to monitor and maintain safety.  Pt. awaiting bed, for transfer in the morning.
pt currently resting in no acute distress at this present time, pt denies any pain/discomfort at this present time.  Will continue to monitor and maintain safety.
Ambulatory with steady gait to bathroom with aide.  Urinated without difficulty.
Assumed patient care from GEORGES Patel at 2300, charting as noted. Receive patient on a yellow gown, lying in bed, a&ox3, able to make needs known in [place] and english. Remains on constant observation with aid at bedside. Patient denies any pain or discomfort at this time, ivf infusing well to right ac iv, patent, site without redness or swelling. Nsr on telebox cm, vss. Patient denies pain and discomfort at this time. Plan of care and wait time explained, patient verbalized understanding. Patient not in respiratory distress. To continue to monitor.
Aunt Larissa - [phone]
Dr. Way made aware of pH 7.24
Pt brought to  approx 7:05a, cooperative, pt states she is currently not SI and states because her children are with their father this made her very depressed and she Overdose of pills and alcohol.  She denies SI/HI denies any pain at this present jodi in no acute distress CIWA 3.  Pt wanded by security, pt in bed resting.   Will continue to monitor and maintain safety.
Pt remains at baseline with family at bedside, pt lethargic, medicated for nausea as per orders, no acute s/s of respiratory distress noted or reported, will continue to monitor
Pt responsive to deep tactile stimulation.  Vital signs stable.  NSR. Pt sent back from CT without study performed due to inability to consent to IV contrast.  Dr. Robles aware and will write administrative consent.
Patient ate 100% of her dinner.  Patient mood is subdued.  Denies suicidal or homicidal ideation.  No attempts to harm self or others and safety of patients maintained.
Patient is resting in bed.  No attempts to harm self or others and safety maintained.
Assumed care of patient at 0730.  Patient resting in bed awake.  Patient endorses having intermittent nausea but identifies nausea has been improving.  No emesis or pain.  Patient verbalizing regret for suicide attempt stating "everything happened so fast I wish I didn't do it".   No attempts to harm self or others.  Patient educated about plan of care and verbalized agreement with same.  Patient cooperated with ordered testing.  Safety of patient maintained.
Patient mood is subdued.  Patient ate 50% of her breakfast.  Patient endorses some mild nausea still s/p overdose of Motrin.  Patient is not exhibiting any signs or symptoms of withdrawal and CIWA is 1.  Safety maintained.

## 2018-05-07 NOTE — PROGRESS NOTE ADULT - SUBJECTIVE AND OBJECTIVE BOX
Patient states that she is feeling a little better today. Is still nauseous.    She is agreeable for voluntary admission to inpatient psych unit fearful she will act on suicidal impulses. Alcohol abuse is admittedly a problem Feels overwhelmed by stressors "Too many things going on"      Lying comfortably in bed. Willing to eat breakfast. Omeprazole given for acid reflux.       Vital Signs Last 24 Hrs  T(C): 37.1 (07 May 2018 07:25), Max: 37.1 (06 May 2018 11:43)  T(F): 98.7 (07 May 2018 07:25), Max: 98.8 (06 May 2018 11:43)  HR: 60 (07 May 2018 07:25) (58 - 79)  BP: 115/76 (07 May 2018 07:25) (108/63 - 120/72)  RR: 18 (07 May 2018 07:25) (17 - 18)  SpO2: 100% (07 May 2018 07:25) (97% - 100%)      ROS: +nausea and acid reflux.     MEDICATIONS  (STANDING):  pantoprazole    Tablet 40 milliGRAM(s) Oral before breakfast

## 2018-05-07 NOTE — ED ADULT NURSE REASSESSMENT NOTE - CONDITION
Pt asleep on initial rounds, up for breakfast. Complains of limited sleep. C/O stomach pains . Medicated with Bentyl. Pt informed she will leave shortly, requested a shower./unchanged

## 2018-05-07 NOTE — ED CDU PROVIDER DISPOSITION NOTE - CLINICAL COURSE
29 yo female presents for evaluation of suicidal ideation and attempt secondary to severe depression. Patient stated she drank alcohol and took several pills. Patient medically cleared and feeling much better. Patient is currently awaiting placement.

## 2018-05-07 NOTE — PROGRESS NOTE ADULT - ASSESSMENT
Due to high suicidal risk from OD on Motrin and ETOH consumption patient will be admitted to inpatient psychiatry.

## 2018-12-11 NOTE — ED ADULT NURSE NOTE - TEMPLATE LIST FOR HEAD TO TOE ASSESSMENT
WE RECEIVED ORDERS FOR PATIENT FOR BI LATERAL SI INJECTION FROM [facility]ARDS (M46.1)  PATIENT WAS SCHEDULED FOR AN OV BUT IT WAS CXL BECAUSE IT WAS FOR FOR 2019   WANTING TO KNOW IF HE NEEDS AN OV OR IF HE CAN JUST BE SCHEDULED FOR THE INJECTION? PLEASE ADVISE   s/p right ureteroplasty and insertion of ureteral stent Toxicology

## 2019-07-04 ENCOUNTER — EMERGENCY (EMERGENCY)
Facility: HOSPITAL | Age: 29
LOS: 1 days | Discharge: HOME | End: 2019-07-04
Attending: EMERGENCY MEDICINE
Payer: COMMERCIAL

## 2019-07-04 VITALS
WEIGHT: 149.91 LBS | HEART RATE: 102 BPM | RESPIRATION RATE: 18 BRPM | OXYGEN SATURATION: 98 % | TEMPERATURE: 98 F | DIASTOLIC BLOOD PRESSURE: 83 MMHG | HEIGHT: 64 IN | SYSTOLIC BLOOD PRESSURE: 138 MMHG

## 2019-07-04 PROBLEM — R01.1 CARDIAC MURMUR, UNSPECIFIED: Chronic | Status: ACTIVE | Noted: 2018-05-05

## 2019-07-04 PROBLEM — Z78.9 OTHER SPECIFIED HEALTH STATUS: Chronic | Status: ACTIVE | Noted: 2017-08-20

## 2019-07-04 PROBLEM — K29.70 GASTRITIS, UNSPECIFIED, WITHOUT BLEEDING: Chronic | Status: ACTIVE | Noted: 2018-05-05

## 2019-07-04 PROCEDURE — 99285 EMERGENCY DEPT VISIT HI MDM: CPT

## 2019-07-04 PROCEDURE — 99284 EMERGENCY DEPT VISIT MOD MDM: CPT
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
## 2019-07-04 NOTE — ED PROVIDER NOTE - CONSTITUTIONAL, MLM
normal... smells of etoh  awake, alert, oriented to person, place, time/situation and in no apparent distress.

## 2019-07-04 NOTE — ED PROVIDER NOTE - OBJECTIVE STATEMENT
pt presents s/p etoh use denies si or hi no trauma states " I drank too much" denies fever. denies HA or neck pain. no chest pain or sob. no abd pain. no n/v/d. no urinary f/u/d. no back pain. no motor or sensory deficits.  no rash. no other acute issues symptoms or concerns

## 2019-07-04 NOTE — ED PROVIDER NOTE - CLINICAL SUMMARY MEDICAL DECISION MAKING FREE TEXT BOX
clincally sober no trumma ambulating in nad refusing etoh resources boyfriend here to pick pt up return precautions given

## 2019-07-04 NOTE — ED ADULT NURSE REASSESSMENT NOTE - NS ED NURSE REASSESS COMMENT FT1
pt a&ox3, no n/v/d, ambulating with steady gait independently. tolerating po intake. boyfriend at bedside. ready for d/c, dr jordan aware and evaluated pt for dC

## 2019-07-04 NOTE — ED ADULT NURSE NOTE - NSIMPLEMENTINTERV_GEN_ALL_ED
Implemented All Fall Risk Interventions:  [place] to call system. Call bell, personal items and telephone within reach. Instruct patient to call for assistance. Room bathroom lighting operational. Non-slip footwear when patient is off stretcher. Physically safe environment: no spills, clutter or unnecessary equipment. Stretcher in lowest position, wheels locked, appropriate side rails in place. Provide visual cue, wrist band, yellow gown, etc. Monitor gait and stability. Monitor for mental status changes and reorient to person, place, and time. Review medications for side effects contributing to fall risk. Reinforce activity limits and safety measures with patient and family.
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
## 2019-08-15 ENCOUNTER — APPOINTMENT (OUTPATIENT)
Dept: GASTROENTEROLOGY | Facility: CLINIC | Age: 29
End: 2019-08-15

## 2019-09-16 NOTE — ED ADULT NURSE NOTE - PAIN RATING/NUMBER SCALE (0-10): REST
Subjective:      Patient ID: Marie Mcgraw is a 65 y.o. female. HPI     8

## 2019-10-11 NOTE — ED ADULT NURSE NOTE - DISCHARGE DATE/TIME
Is This A New Presentation, Or A Follow-Up?: Discoloration How Severe Is It?: moderate 04-Jul-2019 04:53

## 2019-11-05 ENCOUNTER — APPOINTMENT (OUTPATIENT)
Dept: GASTROENTEROLOGY | Facility: CLINIC | Age: 29
End: 2019-11-05
Payer: MEDICAID

## 2019-11-05 VITALS
OXYGEN SATURATION: 98 % | DIASTOLIC BLOOD PRESSURE: 77 MMHG | HEIGHT: 63 IN | WEIGHT: 160 LBS | SYSTOLIC BLOOD PRESSURE: 143 MMHG | BODY MASS INDEX: 28.35 KG/M2 | RESPIRATION RATE: 16 BRPM | HEART RATE: 101 BPM

## 2019-11-05 DIAGNOSIS — R12 HEARTBURN: ICD-10-CM

## 2019-11-05 DIAGNOSIS — R10.13 EPIGASTRIC PAIN: ICD-10-CM

## 2019-11-05 PROCEDURE — 99203 OFFICE O/P NEW LOW 30 MIN: CPT

## 2019-11-05 NOTE — REASON FOR VISIT
[Consultation] : a consultation visit [FreeTextEntry1] : Chronic dyspepsia, heartburn and regurgitation.

## 2019-11-05 NOTE — PHYSICAL EXAM
[Outer Ear] : the ears and nose were normal in appearance [Oropharynx] : the oropharynx was normal [Neck Appearance] : the appearance of the neck was normal [Neck Cervical Mass (___cm)] : no neck mass was observed [Jugular Venous Distention Increased] : there was no jugular-venous distention [Thyroid Diffuse Enlargement] : the thyroid was not enlarged [Thyroid Nodule] : there were no palpable thyroid nodules [Auscultation Breath Sounds / Voice Sounds] : lungs were clear to auscultation bilaterally [Heart Rate And Rhythm] : heart rate was normal and rhythm regular [Heart Sounds] : normal S1 and S2 [Heart Sounds Gallop] : no gallops [Murmurs] : no murmurs [Heart Sounds Pericardial Friction Rub] : no pericardial rub [Bowel Sounds] : normal bowel sounds [Abdomen Soft] : soft [Abdomen Tenderness] : non-tender [] : no hepato-splenomegaly [Abdomen Mass (___ Cm)] : no abdominal mass palpated

## 2019-11-05 NOTE — HISTORY OF PRESENT ILLNESS
[FreeTextEntry1] : 29-year-old,  female, whose only significant medical history was that of clinical gastritis, diagnosed at the age of 13, and then symptomatically treated for it ever since in [facility] 16 years ago. Patient has been on PPI medication for the past 16 years with fairly good control of her symptoms. There was a prior ER evaluation about 2 years ago, but no significant abnormalities were identified. Admission was not required. Patient alleges that last blood work was done in 2/19 and a notable only for hyperlipidemia and anemia. Patient has epigastric pain, which is a chronic and worsened by intake of fluids specifically, fatty food. She's never had a sonogram. This symptoms do not waxing waning. There is no rectal quadrant pain, or biliary colic symptoms. No prior upper endoscopy. No fever or jaundice. No symptoms of biliary colic. Overall symptoms have been improved with the use of omeprazole, heartburn and regurgitation are occasional and breakthrough despite the use of PPI medication. No medication side effect. She denies any dysphagia or diet, aphasia, or GI bleeding or melena. She denies having any weight loss and in fact, has gained 30 pounds in the last 3 months. Patient does not use aspirin or NSAIDs. There has been no prior GI bleeding.

## 2019-11-05 NOTE — ASSESSMENT
[FreeTextEntry1] : Chronic dyspepsia with moderate reflux symptomatology. No alarm symptoms. Possible Villatoro's esophagus or chronic dyspepsia or HPV infection. Possible anatomic defect such as hiatus hernia. Therefore, a screening endoscopy was offered to the patient. The procedure, its risks, benefits, and prep, were explained to patient, who understands and is agreeable to proceed. Sonogram of the abdomen has been requested to exclude gallstones and any other upper GI pathology. PPI medication. We held for 2 weeks prior to the procedure. Blood work prior to the procedure well. Include CBC, CMP, coagulation profile, amylase, lipase. ASA #2. Mallampati score. #2.

## 2019-11-05 NOTE — CONSULT LETTER
[Dear  ___] : Dear  [unfilled], [Consult Letter:] : I had the pleasure of evaluating your patient, [unfilled]. [Please see my note below.] : Please see my note below. [Consult Closing:] : Thank you very much for allowing me to participate in the care of this patient.  If you have any questions, please do not hesitate to contact me. [Sincerely,] : Sincerely, [FreeTextEntry1] : hronic dyspepsia. Chronic heartburn and regurgitation. On PPI medication for 16 years. No upper GI alarm symptoms. Negative. Physical exam. Plan, sonogram upper abdomen, blood work, amylase, lipase, upper endoscopy. Arranged; Results to follow. [FreeTextEntry3] : Carlos Cardoso MD FACG\par Diplomate American Board of Internal Medicine and Gastroenterolgy\par [facility]\par

## 2020-02-05 ENCOUNTER — APPOINTMENT (OUTPATIENT)
Dept: GASTROENTEROLOGY | Facility: GI CENTER | Age: 30
End: 2020-02-05

## 2020-03-12 ENCOUNTER — LABORATORY RESULT (OUTPATIENT)
Age: 30
End: 2020-03-12

## 2020-03-15 ENCOUNTER — APPOINTMENT (OUTPATIENT)
Dept: ULTRASOUND IMAGING | Facility: CLINIC | Age: 30
End: 2020-03-15

## 2020-03-16 LAB
ALBUMIN SERPL ELPH-MCNC: 4.3 G/DL
ALP BLD-CCNC: 112 U/L
ALT SERPL-CCNC: 47 U/L
AMYLASE/CREAT SERPL: 48 U/L
ANION GAP SERPL CALC-SCNC: 14 MMOL/L
AST SERPL-CCNC: 34 U/L
BASOPHILS # BLD AUTO: 0.03 K/UL
BASOPHILS NFR BLD AUTO: 0.5 %
BILIRUB SERPL-MCNC: 0.3 MG/DL
BUN SERPL-MCNC: 9 MG/DL
CALCIUM SERPL-MCNC: 9.1 MG/DL
CHLORIDE SERPL-SCNC: 104 MMOL/L
CO2 SERPL-SCNC: 21 MMOL/L
CREAT SERPL-MCNC: 0.5 MG/DL
EOSINOPHIL # BLD AUTO: 0.1 K/UL
EOSINOPHIL NFR BLD AUTO: 1.6 %
GLUCOSE SERPL-MCNC: 108 MG/DL
HCT VFR BLD CALC: 30.5 %
HGB BLD-MCNC: 8.3 G/DL
IMM GRANULOCYTES NFR BLD AUTO: 0.3 %
INR PPP: 0.97 RATIO
LPL SERPL-CCNC: 28 U/L
LYMPHOCYTES # BLD AUTO: 1.39 K/UL
LYMPHOCYTES NFR BLD AUTO: 22.4 %
MAN DIFF?: NORMAL
MCHC RBC-ENTMCNC: 18.9 PG
MCHC RBC-ENTMCNC: 27.2 GM/DL
MCV RBC AUTO: 69.3 FL
MONOCYTES # BLD AUTO: 0.47 K/UL
MONOCYTES NFR BLD AUTO: 7.6 %
NEUTROPHILS # BLD AUTO: 4.2 K/UL
NEUTROPHILS NFR BLD AUTO: 67.6 %
PLATELET # BLD AUTO: 224 K/UL
POTASSIUM SERPL-SCNC: 4.1 MMOL/L
PROT SERPL-MCNC: 7.3 G/DL
PT BLD: 11.1 SEC
RBC # BLD: 4.4 M/UL
RBC # FLD: 21 %
SODIUM SERPL-SCNC: 139 MMOL/L
WBC # FLD AUTO: 6.21 K/UL

## 2020-05-14 ENCOUNTER — TRANSCRIPTION ENCOUNTER (OUTPATIENT)
Age: 30
End: 2020-05-14

## 2021-05-17 ENCOUNTER — APPOINTMENT (OUTPATIENT)
Dept: NEUROLOGY | Facility: CLINIC | Age: 31
End: 2021-05-17

## 2021-07-26 NOTE — ED PROVIDER NOTE - ATTESTATION, MLM
Patient confirmed they are following physician to private practice.  Provider at [place].   Informed patient to contact physician's private practice effective 7/1/2021 at [phone].  
I have reviewed and confirmed nurses' notes for patient's medications, allergies, medical history, and surgical history.

## 2021-11-09 NOTE — ED PROVIDER NOTE - CROS ED EYES ALL NEG
Spoke with dtr Colie Drafts. Denies pt having a covid test. Pt was on a stimulant laxative per recent hospital stay d/t constipation. dtr dc'd 10/25/21 d/t diarrhea. dtr concerned pt is dehydrated, may have cdiff. dtr stated she doesn't believe her mother will go to hospital. dtr fearful, crying.  o2 94%, bp elevated 163/78 am today, 169/83 p 63, t 97 9:30 am. negative...

## 2022-03-03 ENCOUNTER — APPOINTMENT (OUTPATIENT)
Dept: GASTROENTEROLOGY | Facility: CLINIC | Age: 32
End: 2022-03-03
Payer: MEDICAID

## 2022-03-03 ENCOUNTER — LABORATORY RESULT (OUTPATIENT)
Age: 32
End: 2022-03-03

## 2022-03-03 VITALS
TEMPERATURE: 98.2 F | BODY MASS INDEX: 29.95 KG/M2 | SYSTOLIC BLOOD PRESSURE: 124 MMHG | HEART RATE: 98 BPM | WEIGHT: 169 LBS | OXYGEN SATURATION: 97 % | RESPIRATION RATE: 14 BRPM | DIASTOLIC BLOOD PRESSURE: 76 MMHG | HEIGHT: 63 IN

## 2022-03-03 DIAGNOSIS — R79.89 OTHER SPECIFIED ABNORMAL FINDINGS OF BLOOD CHEMISTRY: ICD-10-CM

## 2022-03-03 PROCEDURE — 99213 OFFICE O/P EST LOW 20 MIN: CPT

## 2022-03-03 RX ORDER — OMEPRAZOLE 40 MG/1
40 CAPSULE, DELAYED RELEASE ORAL
Refills: 0 | Status: DISCONTINUED | COMMUNITY
End: 2022-03-03

## 2022-03-03 NOTE — PHYSICAL EXAM
[Bowel Sounds] : normal bowel sounds [Abdomen Soft] : soft [Abdomen Tenderness] : non-tender [] : no hepato-splenomegaly [Abdomen Mass (___ Cm)] : no abdominal mass palpated [Abdomen Hernia] : no hernia was discovered sudden onset

## 2022-03-12 LAB
A1AT PHENOTYP SERPL-IMP: NORMAL
A1AT SERPL-MCNC: 140 MG/DL
ALBUMIN SERPL ELPH-MCNC: 4.5 G/DL
ALP BLD-CCNC: 150 U/L
ALT SERPL-CCNC: 197 U/L
ANA PAT FLD IF-IMP: ABNORMAL
ANA SER IF-ACNC: ABNORMAL
ANION GAP SERPL CALC-SCNC: 12 MMOL/L
AST SERPL-CCNC: 102 U/L
BASOPHILS # BLD AUTO: 0 K/UL
BASOPHILS NFR BLD AUTO: 0 %
BILIRUB SERPL-MCNC: 0.3 MG/DL
BUN SERPL-MCNC: 6 MG/DL
CALCIUM SERPL-MCNC: 9.5 MG/DL
CERULOPLASMIN SERPL-MCNC: 34 MG/DL
CHLORIDE SERPL-SCNC: 103 MMOL/L
CO2 SERPL-SCNC: 24 MMOL/L
CREAT SERPL-MCNC: 0.43 MG/DL
DEPRECATED KAPPA LC FREE/LAMBDA SER: 1.33 RATIO
EGFR: 134 ML/MIN/1.73M2
EOSINOPHIL # BLD AUTO: 0.07 K/UL
EOSINOPHIL NFR BLD AUTO: 0.9 %
FERRITIN SERPL-MCNC: 21 NG/ML
GLUCOSE SERPL-MCNC: 103 MG/DL
HBV CORE IGG+IGM SER QL: NONREACTIVE
HBV CORE IGM SER QL: NONREACTIVE
HBV SURFACE AB SER QL: ABNORMAL
HBV SURFACE AG SER QL: NONREACTIVE
HCT VFR BLD CALC: 32.9 %
HCV AB SER QL: NONREACTIVE
HCV S/CO RATIO: 0.14 S/CO
HEPATITIS E IGM ABY: NOT DETECTED
HEV AB SER QL: NEGATIVE
HGB BLD-MCNC: 9.3 G/DL
IGA SER QL IEP: 342 MG/DL
IGG SER QL IEP: 1438 MG/DL
IGM SER QL IEP: 212 MG/DL
INR PPP: 1.02 RATIO
IRON SATN MFR SERPL: 3 %
IRON SERPL-MCNC: 17 UG/DL
KAPPA LC CSF-MCNC: 1.81 MG/DL
KAPPA LC SERPL-MCNC: 2.41 MG/DL
LKM AB SER QL IF: <20.1 UNITS
LYMPHOCYTES # BLD AUTO: 2.54 K/UL
LYMPHOCYTES NFR BLD AUTO: 33 %
MAN DIFF?: NORMAL
MCHC RBC-ENTMCNC: 19.6 PG
MCHC RBC-ENTMCNC: 28.3 GM/DL
MCV RBC AUTO: 69.4 FL
MITOCHONDRIA AB SER IF-ACNC: NORMAL
MONOCYTES # BLD AUTO: 0.6 K/UL
MONOCYTES NFR BLD AUTO: 7.8 %
NEUTROPHILS # BLD AUTO: 4.49 K/UL
NEUTROPHILS NFR BLD AUTO: 58.3 %
NT-PROBNP SERPL-MCNC: 32 PG/ML
PLATELET # BLD AUTO: 311 K/UL
POTASSIUM SERPL-SCNC: 4.1 MMOL/L
PROT SERPL-MCNC: 7.8 G/DL
PT BLD: 11.9 SEC
RBC # BLD: 4.74 M/UL
RBC # FLD: 23.7 %
SMOOTH MUSCLE AB SER QL IF: NORMAL
SODIUM SERPL-SCNC: 139 MMOL/L
SOLUBLE LIVER IGG SER IA-ACNC: 0.7
TIBC SERPL-MCNC: 532 UG/DL
TSH SERPL-ACNC: 0.99 UIU/ML
UIBC SERPL-MCNC: 516 UG/DL
WBC # FLD AUTO: 7.71 K/UL

## 2022-03-12 NOTE — HISTORY OF PRESENT ILLNESS
[IV Drug Use] : IV drug use [Tattoo] : tattoo(s) [Travel to Endemic Area] : travel to an endemic area [Cocaine Use] : cocaine use [de-identified] : Very pleasant 31-year-old female patient was sent in by her primary care physician to evaluate for LFT elevation and fatty liver seen on ultrasound.\par Labs are from December 2021 and revealed significant transaminase elevation in the form of AST of 275 ALT of 369 with normal alkaline phosphatase and normal total bilirubin.  LDH was elevated at 408.\par \par Patient does not have any history of diabetes or hypertension or hyperlipidemia.\par She does have a history of excessive drinking of more than 7 drinks per week but she stopped it 5 months ago.  These labs were drawn every 3 months after she stopped drinking daily.  She states she only drinks a little during parties etc.\par \par Patient gives a family history of liver disease in her mother and her grandfather.\par It is noted that her hemoglobin has been consistently low around 7 to 8 g.  Patient states that her mother also suffers from anemia.\par She states her anemia was investigated in outside hospital with an EGD a few years ago but does not remember the results.  He states she was started on iron many years ago when she continues to take it for years with no result in anemia resolution.\par \par Patient denies hematemesis melena jaundice.\par Patient is synthetically intact with normal platelets\par Other review of symptoms are negative.\par She has no stigmata of chronic liver disease.\par Patient states to suffering from some sort of chronic gastritis

## 2022-03-12 NOTE — ASSESSMENT
[FreeTextEntry1] : 31-year-old female patient with no significant past medical history except for anemia and a family history of liver disease presents for evaluation of elevated transaminases and ultrasound documented hepatic steatosis.\par Hepatic steatosis, secondary to prior alcohol use which was stopped approximately 3 months prior to the ultrasound but LFTs not easily explainable by that.\par Given family history of liver disease and anemia have ordered labs to rule out various etiologies of elevated LFTs especially ceruloplasmin.\par Other labs were sent to rule out viral hepatitis, autoimmune liver disease, hemochromatosis etc.\par Follow-up in 2 to 3 weeks for discussion of lab results.\par \par Chronic anemia\par Seems to be microcytic hyperchromic.  Patient states that she is being told it could potentially be from excessive menstrual bleeding she has not seen OB/GYN for this.  Patient states says that she suffers from significant past postpartum hemorrhage during her vaginal deliveries in the past.\par Have asked the patient to make an appointment with her GYN and follow-up on GYN because of anemia.\par In the meantime at the next visit we will discuss upper and lower GI stages to look for GI etiologies of liver anemia.

## 2022-03-17 ENCOUNTER — APPOINTMENT (OUTPATIENT)
Dept: GASTROENTEROLOGY | Facility: CLINIC | Age: 32
End: 2022-03-17

## 2022-04-14 ENCOUNTER — APPOINTMENT (OUTPATIENT)
Dept: GASTROENTEROLOGY | Facility: CLINIC | Age: 32
End: 2022-04-14

## 2022-08-24 NOTE — ED ADULT TRIAGE NOTE - PAIN RATING/NUMBER SCALE (0-10): ACTIVITY
Abdomen , soft, nontender, nondistended , no guarding or rigidity , no masses palpable , normal bowel sounds , Liver and Spleen , no hepatomegaly present , no hepatosplenomegaly , liver nontender , spleen not palpable 3

## 2022-10-06 NOTE — ED ADULT NURSE NOTE - CAS EDP DISCH TYPE
10/06/22 4827   Service Assessment   Patient Orientation Alert and Oriented   Cognition Alert   History Provided By Patient   Primary Caregiver Self   Accompanied By/Relationship Mother   Support Systems Family Members;Parent   Patient's Healthcare Decision Maker is: Legal Next of [address] 70   PCP Verified by CM Yes   Last Visit to PCP Within last 3 months   Prior Functional Level Independent in ADLs/IADLs   Current Functional Level Independent in ADLs/IADLs   Can patient return to prior living arrangement Yes   Ability to make needs known: Fair   Family able to assist with home care needs: Yes   Would you like for me to discuss the discharge plan with any other family members/significant others, and if so, who? Yes  (Mother)   Financial Resources Medicare;Medicaid   [employer]   (denied needs)   CM/SW Referral   (denied needs)   Social/Functional History   Lives With Parent  (Mother)   Type of [address] [place] [address] Drive Shower/Tub Tub/Shower unit   [address]   (denied use/need)   [address]   (denied use/need)   [address] Help From Family   ADL Assistance Independent   Homemaking Assistance Independent   Ambulation Assistance Independent   Transfer Assistance Independent   Active  Yes   Mode of Transportation Car   Occupation On disability   Discharge Planning   Type of [place] Parent   Current Services Prior To Admission None   Potential Assistance Needed   (denied any needs at this time)   DME Ordered? No   Potential Assistance Purchasing Medications No   Type of Home Care Services None   Patient expects to be discharged to: House     Patient Contact Information:    [address]  [phone] (home)   Telephone Information:   Mobile [phone]     Above information verified?     [x]   Yes  []   No      Emergency Contacts:    Extended Emergency Contact Information  Primary Emergency Contact: Rocio Mcneil  Address: [address], [facility] [address] Phone: [phone]  Relation: Parent  Secondary Emergency Contact: Leobardo Mireles  Home Phone: [phone]  Relation: Brother/Sister      Have you been vaccinated for COVID-19 (SARS-CoV-2)? [x]   Yes  []   No                   If so, when?     Which :         []   Pfizer-BioNTech  [x]   Moderna  []   Rui Anne  []   Other:         Pharmacy:    [facility], [facility] [address] [facility] [phone]  [facility] [facility]  [address] [address]  Phone: [phone] Fax: [phone] [address] [facility] [facility] Marco Antonio Pepper [phone] - F [phone]  [address] [place] [address]  Phone: [phone] Fax: [phone]          Patient Deficits:    []   Yes   [x]   No    If yes:    []   Confusion/Memory  []   Visual  []   Motor/Sensory         []   Right arm         []   Right leg         []   Left arm         []   Left leg  []   Language/Speech         []   Aphasia         []   Dysarthria         []   Swallow         [place] Coma Scale  Eye Opening: Spontaneous  Best Verbal Response: Oriented  Best Motor Response: Obeys commands  Saul Coma Scale Score: 15    Patient Deficit Notes: Home

## 2024-06-27 ENCOUNTER — APPOINTMENT (OUTPATIENT)
Dept: ORTHOPEDIC SURGERY | Facility: CLINIC | Age: 34
End: 2024-06-27
Payer: COMMERCIAL

## 2024-06-27 VITALS — BODY MASS INDEX: 29.23 KG/M2 | WEIGHT: 165 LBS | HEIGHT: 63 IN

## 2024-06-27 DIAGNOSIS — Z00.00 ENCOUNTER FOR GENERAL ADULT MEDICAL EXAMINATION W/OUT ABNORMAL FINDINGS: ICD-10-CM

## 2024-06-27 DIAGNOSIS — M72.2 PLANTAR FASCIAL FIBROMATOSIS: ICD-10-CM

## 2024-06-27 DIAGNOSIS — Z78.9 OTHER SPECIFIED HEALTH STATUS: ICD-10-CM

## 2024-06-27 PROCEDURE — 73630 X-RAY EXAM OF FOOT: CPT | Mod: LT

## 2024-06-27 PROCEDURE — 99203 OFFICE O/P NEW LOW 30 MIN: CPT

## 2024-07-17 ENCOUNTER — APPOINTMENT (OUTPATIENT)
Dept: ORTHOPEDIC SURGERY | Facility: CLINIC | Age: 34
End: 2024-07-17
Payer: COMMERCIAL

## 2024-07-17 VITALS — WEIGHT: 165 LBS | HEIGHT: 63 IN | BODY MASS INDEX: 29.23 KG/M2

## 2024-07-17 DIAGNOSIS — M18.12 UNILATERAL PRIMARY OSTEOARTHRITIS OF FIRST CARPOMETACARPAL JOINT, LEFT HAND: ICD-10-CM

## 2024-07-17 PROCEDURE — 99204 OFFICE O/P NEW MOD 45 MIN: CPT

## 2024-07-17 PROCEDURE — L3809: CPT

## 2024-07-22 ENCOUNTER — APPOINTMENT (OUTPATIENT)
Dept: ORTHOPEDIC SURGERY | Facility: CLINIC | Age: 34
End: 2024-07-22

## 2024-08-21 ENCOUNTER — APPOINTMENT (OUTPATIENT)
Dept: ORTHOPEDIC SURGERY | Facility: CLINIC | Age: 34
End: 2024-08-21